# Patient Record
Sex: MALE | Race: WHITE | Employment: FULL TIME | URBAN - METROPOLITAN AREA
[De-identification: names, ages, dates, MRNs, and addresses within clinical notes are randomized per-mention and may not be internally consistent; named-entity substitution may affect disease eponyms.]

---

## 2018-02-08 ENCOUNTER — HOSPITAL ENCOUNTER (OUTPATIENT)
Age: 33
Discharge: HOME | End: 2018-02-08
Payer: COMMERCIAL

## 2018-02-08 PROBLEM — R00.2 HEART PALPITATIONS: Status: ACTIVE | Noted: 2018-02-08

## 2018-02-08 PROBLEM — Z72.0 CHEWS TOBACCO: Status: ACTIVE | Noted: 2018-02-08

## 2018-02-08 PROBLEM — F10.10 ETOH ABUSE: Status: ACTIVE | Noted: 2018-02-08

## 2018-02-08 PROBLEM — Z91.09 ENVIRONMENTAL ALLERGIES: Status: ACTIVE | Noted: 2018-02-08

## 2018-02-09 LAB
SEND OUT REPORT: NORMAL
TEST NAME: NORMAL

## 2018-12-17 PROBLEM — F10.10 ETOH ABUSE: Status: RESOLVED | Noted: 2018-02-08 | Resolved: 2018-12-17

## 2019-11-07 PROBLEM — L72.9 SCROTAL CYST: Status: ACTIVE | Noted: 2019-11-07

## 2019-12-12 ENCOUNTER — OFFICE VISIT (OUTPATIENT)
Dept: DERMATOLOGY | Age: 34
End: 2019-12-12
Payer: COMMERCIAL

## 2019-12-12 VITALS
BODY MASS INDEX: 30.91 KG/M2 | HEIGHT: 73 IN | SYSTOLIC BLOOD PRESSURE: 135 MMHG | DIASTOLIC BLOOD PRESSURE: 87 MMHG | WEIGHT: 233.2 LBS | HEART RATE: 94 BPM | OXYGEN SATURATION: 97 %

## 2019-12-12 DIAGNOSIS — Z91.09 NICKEL ALLERGY: ICD-10-CM

## 2019-12-12 DIAGNOSIS — L29.1 PRURITUS SCROTI: ICD-10-CM

## 2019-12-12 DIAGNOSIS — L72.9 SCROTAL CYST: Primary | ICD-10-CM

## 2019-12-12 PROCEDURE — 99202 OFFICE O/P NEW SF 15 MIN: CPT | Performed by: DERMATOLOGY

## 2019-12-12 RX ORDER — TRIAMCINOLONE ACETONIDE 1 MG/G
CREAM TOPICAL
Qty: 80 G | Refills: 1 | Status: SHIPPED | OUTPATIENT
Start: 2019-12-12

## 2019-12-27 ENCOUNTER — HOSPITAL ENCOUNTER (OUTPATIENT)
Age: 34
Discharge: HOME | End: 2019-12-27
Payer: COMMERCIAL

## 2019-12-27 DIAGNOSIS — R63.5 WEIGHT GAIN: ICD-10-CM

## 2019-12-27 DIAGNOSIS — F10.10 ALCOHOL ABUSE: ICD-10-CM

## 2019-12-27 LAB
ALBUMIN SERPL-MCNC: 4.6 G/DL (ref 3.5–5.2)
ALBUMIN/GLOBULIN RATIO: ABNORMAL (ref 1–2.5)
ALP BLD-CCNC: 73 U/L (ref 40–129)
ALT SERPL-CCNC: 113 U/L (ref 5–41)
ANION GAP SERPL CALCULATED.3IONS-SCNC: 11 MMOL/L (ref 9–17)
AST SERPL-CCNC: 50 U/L
BILIRUB SERPL-MCNC: 0.5 MG/DL (ref 0.3–1.2)
BUN BLDV-MCNC: 15 MG/DL (ref 6–20)
BUN/CREAT BLD: ABNORMAL (ref 9–20)
CALCIUM SERPL-MCNC: 10 MG/DL (ref 8.6–10.4)
CHLORIDE BLD-SCNC: 103 MMOL/L (ref 98–107)
CHOLESTEROL/HDL RATIO: 3.7
CHOLESTEROL: 208 MG/DL
CO2: 27 MMOL/L (ref 20–31)
CREAT SERPL-MCNC: 0.7 MG/DL (ref 0.7–1.2)
ESTIMATED AVERAGE GLUCOSE: 105 MG/DL
GFR AFRICAN AMERICAN: >60 ML/MIN
GFR NON-AFRICAN AMERICAN: >60 ML/MIN
GFR SERPL CREATININE-BSD FRML MDRD: ABNORMAL ML/MIN/{1.73_M2}
GFR SERPL CREATININE-BSD FRML MDRD: ABNORMAL ML/MIN/{1.73_M2}
GLUCOSE BLD-MCNC: 97 MG/DL (ref 70–99)
HBA1C MFR BLD: 5.3 % (ref 4–6)
HDLC SERPL-MCNC: 56 MG/DL
LDL CHOLESTEROL: 126 MG/DL (ref 0–130)
POTASSIUM SERPL-SCNC: 5 MMOL/L (ref 3.7–5.3)
SODIUM BLD-SCNC: 141 MMOL/L (ref 135–144)
TOTAL PROTEIN: 8 G/DL (ref 6.4–8.3)
TRIGL SERPL-MCNC: 129 MG/DL
VLDLC SERPL CALC-MCNC: ABNORMAL MG/DL (ref 1–30)

## 2019-12-27 PROCEDURE — 36415 COLL VENOUS BLD VENIPUNCTURE: CPT

## 2019-12-27 PROCEDURE — 80053 COMPREHEN METABOLIC PANEL: CPT

## 2019-12-27 PROCEDURE — 80061 LIPID PANEL: CPT

## 2019-12-27 PROCEDURE — 83036 HEMOGLOBIN GLYCOSYLATED A1C: CPT

## 2019-12-29 PROBLEM — R79.89 ELEVATED LFTS: Status: ACTIVE | Noted: 2019-12-29

## 2020-01-07 ENCOUNTER — HOSPITAL ENCOUNTER (OUTPATIENT)
Dept: ULTRASOUND IMAGING | Age: 35
Discharge: HOME | End: 2020-01-09
Payer: COMMERCIAL

## 2020-01-07 PROCEDURE — 76705 ECHO EXAM OF ABDOMEN: CPT

## 2020-01-14 PROBLEM — R16.0 HEPATOMEGALY: Status: ACTIVE | Noted: 2020-01-14

## 2020-10-14 ENCOUNTER — HOSPITAL ENCOUNTER (OUTPATIENT)
Age: 35
Setting detail: SPECIMEN
Discharge: HOME | End: 2020-10-14
Payer: COMMERCIAL

## 2020-10-14 ENCOUNTER — OFFICE VISIT (OUTPATIENT)
Dept: PRIMARY CARE CLINIC | Age: 35
End: 2020-10-14
Payer: COMMERCIAL

## 2020-10-14 VITALS
HEIGHT: 73 IN | HEART RATE: 95 BPM | BODY MASS INDEX: 30.48 KG/M2 | OXYGEN SATURATION: 97 % | WEIGHT: 230 LBS | TEMPERATURE: 97.2 F

## 2020-10-14 PROCEDURE — 99213 OFFICE O/P EST LOW 20 MIN: CPT | Performed by: NURSE PRACTITIONER

## 2020-10-14 NOTE — PROGRESS NOTES
[address]  [address]  [address] [address]  Dept: [phone]  Dept Fax: [phone]    Royal Sanders a 37 y.o. male who presents to the urgent care today for his medical conditions/complaintsas noted below. Montez Skiff is c/o of Concern For COVID-19 (fatigue, body aches, diarrhea x 2 days )      HPI:     Cc aches, fatigue, diarrhea for couple of days  Denies known covid exposure      Diarrhea    This is a new problem. Episode onset: couple days. The problem has been waxing and waning. Associated symptoms include myalgias. Pertinent negatives include no abdominal pain, chills, coughing, fever, URI or vomiting. Nothing aggravates the symptoms. There are no known risk factors. He has tried nothing for the symptoms. Past Medical History:   Diagnosis Date    History of narcotic addiction ([address]     clean X's 6 years       Current Outpatient Medications   Medication Sig Dispense Refill    triamcinolone (KENALOG) 0.1 % cream Apply to rash on abdomen twice daily for up to 2 weeks (not face, armpit or groin) (Patient not taking: Reported on 10/14/2020) 80 g 1     No current facility-administered medications for this visit. No Known Allergies    Subjective:     Review of Systems   Constitutional: Negative for chills and fever. Respiratory: Negative for cough. Gastrointestinal: Positive for diarrhea. Negative for abdominal pain and vomiting. Musculoskeletal: Positive for myalgias. All other systems reviewed and are negative. Objective:      Physical Exam  Vitals signs and nursing note reviewed. Constitutional:       General: He is not in acute distress. Appearance: Normal appearance. He is well-developed. He is not ill-appearing, toxic-appearing or diaphoretic. HENT:      Head: Normocephalic and atraumatic. Nose: Nose normal.      Mouth/Throat:      Mouth: Mucous membranes are moist.   Eyes:      General: No scleral icterus. Right eye: No discharge. Left eye: No discharge. Neck:      Musculoskeletal: Normal range of motion and neck supple. No neck rigidity. Cardiovascular:      Rate and Rhythm: Normal rate and regular rhythm. Heart sounds: Normal heart sounds. Pulmonary:      Effort: Pulmonary effort is normal. No respiratory distress. Breath sounds: Normal breath sounds. No stridor. No wheezing, rhonchi or rales. Abdominal:      General: Bowel sounds are normal.      Palpations: Abdomen is soft. Tenderness: There is no abdominal tenderness. There is no guarding. Musculoskeletal: Normal range of motion. General: No signs of injury. Skin:     General: Skin is warm and dry. Coloration: Skin is not jaundiced or pale. Findings: No erythema or rash. Neurological:      General: No focal deficit present. Mental Status: He is alert and oriented to person, place, and time. Psychiatric:         Mood and Affect: Mood normal.         Behavior: Behavior normal.       Pulse 95   Temp 97.2 °F (36.2 °C) (Tympanic)   Ht 6' 1\" (1.854 m)   Wt 230 lb (104.3 kg)   SpO2 97%   BMI 30.34 kg/m²     Assessment:          Diagnosis Orders   1. Viral illness  COVID-19 Ambulatory       Plan: You were tested for COVID today. We will call you with results when they are available. If you have not heard from us in 7 days, please call our office. Patient was evaluated carside today during this pandemic covid 19 situation.     Quarantine as directed  Await results  Increase fluids- stay hydrated  Good handwashing  Supportive care as discussed    If having symptoms- you need to be fever free for 24 hours, other symptoms resolved and at least 10 days passed since first symptom appeared before discontinuing  quarantine- CDC guidelines    tylenol as directed as needed over the counter if able to take  go to the ER for chest pain, abdominal pain, short of breath, hard time breathing, persistent vomiting, feeling weaker or dehydrated, any worsening, change or concern  Follow up with primary care for re evaluation  [place] covid 19 call center - 8-808-5-ASK- [facility] (1-)  Another good resource for information is coronavirus. [place].gov    If exposure, it is recommended 14 day quarantine  Return for follow up with primary care in 7 days, go to the ER for worsening, change or concern. Patient given educational materials - see patientinstructions. Discussed use, benefit, and side effects of prescribed medications. All patient questions answered. Pt voiced understanding.     Electronically signed by MOJGAN Jeronimo 10/14/2020 at 3:42 PM

## 2020-10-14 NOTE — PATIENT INSTRUCTIONS
You were tested for COVID today. We will call you with results when they are available. If you have not heard from us in 7 days, please call our office. Patient was evaluated carside today during this pandemic covid 19 situation. Quarantine as directed  Await results  Increase fluids- stay hydrated  Good handwashing  Supportive care as discussed    If having symptoms- you need to be fever free for 24 hours, other symptoms resolved and at least 10 days passed since first symptom appeared before discontinuing  quarantine- CDC guidelines    tylenol as directed as needed over the counter if able to take  go to the ER for chest pain, abdominal pain, short of breath, hard time breathing, persistent vomiting, feeling weaker or dehydrated, any worsening, change or concern  Follow up with primary care for re evaluation  [place] covid 19 call center - [phone]-ASK- [address]  Another good resource for information is coronavirus. [place].gov    If exposure, it is recommended 14 day quarantine

## 2020-10-16 ASSESSMENT — ENCOUNTER SYMPTOMS
DIARRHEA: 1
VOMITING: 0
COUGH: 0
ABDOMINAL PAIN: 0

## 2020-10-19 LAB — SARS-COV-2, NAA: NOT DETECTED

## 2020-12-12 ENCOUNTER — OFFICE VISIT (OUTPATIENT)
Dept: PRIMARY CARE CLINIC | Age: 35
End: 2020-12-12
Payer: COMMERCIAL

## 2020-12-12 VITALS
HEART RATE: 89 BPM | SYSTOLIC BLOOD PRESSURE: 138 MMHG | BODY MASS INDEX: 30.48 KG/M2 | OXYGEN SATURATION: 99 % | WEIGHT: 230 LBS | TEMPERATURE: 98.4 F | HEIGHT: 73 IN | DIASTOLIC BLOOD PRESSURE: 88 MMHG

## 2020-12-12 PROCEDURE — 99214 OFFICE O/P EST MOD 30 MIN: CPT | Performed by: NURSE PRACTITIONER

## 2020-12-12 RX ORDER — ACYCLOVIR 800 MG/1
800 TABLET ORAL
Qty: 35 TABLET | Refills: 0 | Status: SHIPPED | OUTPATIENT
Start: 2020-12-12 | End: 2020-12-19

## 2020-12-12 NOTE — PATIENT INSTRUCTIONS
Return worse  Patient Education        Shingles: Care Instructions  Your Care Instructions     Shingles (herpes zoster) causes pain and a blistered rash. The rash can appear anywhere on the body but will be on only one side of the body, the left or right. It will be in a band, a strip, or a small area. The pain can be very severe. Shingles can also cause tingling or itching in the area of the rash. The blisters scab over after a few days and heal in 2 to 4 weeks. Medicines can help you feel better and may help prevent more serious problems caused by shingles. Shingles is caused by the same virus that causes chickenpox. When you have chickenpox, the virus gets into your nerve roots and stays there (becomes dormant) long after you get over the chickenpox. If the virus becomes active again, it can cause shingles. Follow-up care is a key part of your treatment and safety. Be sure to make and go to all appointments, and call your doctor if you are having problems. It's also a good idea to know your test results and keep a list of the medicines you take. How can you care for yourself at home? · Be safe with medicines. Take your medicines exactly as prescribed. Call your doctor if you think you are having a problem with your medicine. Antiviral medicine helps you get better faster. · Try not to scratch or pick at the blisters. They will crust over and fall off on their own if you leave them alone. · Put cool, wet cloths on the area to relieve pain and itching. You can also use calamine lotion. Try not to use so much lotion that it cakes and is hard to get off. · Put cornstarch or baking soda on the sores to help dry them out so they heal faster. · Do not use thick ointment, such as petroleum jelly, on the sores. This will keep them from drying and healing. · To help remove loose crusts, soak them in tap water. This can help decrease oozing, and dry and soothe the skin.   · Take an over-the-counter pain medicine, such as acetaminophen (Tylenol), ibuprofen (Advil, Motrin), or naproxen (Aleve). Read and follow all instructions on the label. · Avoid close contact with people until the blisters have healed. It is very important for you to avoid contact with anyone who has never had chickenpox or the chickenpox vaccine. Pregnant women, young babies, and anyone else who has a hard time fighting infection (such as someone with HIV, diabetes, or cancer) is especially at risk. When should you call for help? Call your doctor now or seek immediate medical care if:    · You have a new or higher fever.     · You have a severe headache and a stiff neck.     · You lose the ability to think clearly.     · The rash spreads to your forehead, nose, eyes, or eyelids.     · You have eye pain, or your vision gets worse.     · You have new pain in your face, or you cannot move the muscles in your face.     · Blisters spread to new parts of your body. Watch closely for changes in your health, and be sure to contact your doctor if:    · The rash has not healed after 2 to 4 weeks.     · You still have pain after the rash has healed. Where can you learn more? Go to https://Mavenpepiceweb.Keisense. org and sign in to your Pocket Tales account. Dhruv Alicea in the [facility] box to learn more about \"Shingles: Care Instructions. \"     If you do not have an account, please click on the \"Sign Up Now\" link. Current as of: February 11, 2020               Content Version: 12.6  © 2006-2020 [employer]. Care instructions adapted under license by [facility] ([facility]). If you have questions about a medical condition or this instruction, always ask your healthcare professional. [place] [address] any warranty or liability for your use of this information. Patient Education        Shingles: Care Instructions  Your Care Instructions     Shingles (herpes zoster) causes pain and a blistered rash.  The rash can appear anywhere on the body but will be on only one side of the body, the left or right. It will be in a band, a strip, or a small area. The pain can be very severe. Shingles can also cause tingling or itching in the area of the rash. The blisters scab over after a few days and heal in 2 to 4 weeks. Medicines can help you feel better and may help prevent more serious problems caused by shingles. Shingles is caused by the same virus that causes chickenpox. When you have chickenpox, the virus gets into your nerve roots and stays there (becomes dormant) long after you get over the chickenpox. If the virus becomes active again, it can cause shingles. Follow-up care is a key part of your treatment and safety. Be sure to make and go to all appointments, and call your doctor if you are having problems. It's also a good idea to know your test results and keep a list of the medicines you take. How can you care for yourself at home? · Be safe with medicines. Take your medicines exactly as prescribed. Call your doctor if you think you are having a problem with your medicine. Antiviral medicine helps you get better faster. · Try not to scratch or pick at the blisters. They will crust over and fall off on their own if you leave them alone. · Put cool, wet cloths on the area to relieve pain and itching. You can also use calamine lotion. Try not to use so much lotion that it cakes and is hard to get off. · Put cornstarch or baking soda on the sores to help dry them out so they heal faster. · Do not use thick ointment, such as petroleum jelly, on the sores. This will keep them from drying and healing. · To help remove loose crusts, soak them in tap water. This can help decrease oozing, and dry and soothe the skin. · Take an over-the-counter pain medicine, such as acetaminophen (Tylenol), ibuprofen (Advil, Motrin), or naproxen (Aleve). Read and follow all instructions on the label.   · Avoid close contact with people until the blisters have healed. It is very important for you to avoid contact with anyone who has never had chickenpox or the chickenpox vaccine. Pregnant women, young babies, and anyone else who has a hard time fighting infection (such as someone with HIV, diabetes, or cancer) is especially at risk. When should you call for help? Call your doctor now or seek immediate medical care if:    · You have a new or higher fever.     · You have a severe headache and a stiff neck.     · You lose the ability to think clearly.     · The rash spreads to your forehead, nose, eyes, or eyelids.     · You have eye pain, or your vision gets worse.     · You have new pain in your face, or you cannot move the muscles in your face.     · Blisters spread to new parts of your body. Watch closely for changes in your health, and be sure to contact your doctor if:    · The rash has not healed after 2 to 4 weeks.     · You still have pain after the rash has healed. Where can you learn more? Go to https://Bluff WarspePush Technology.Ebix. org and sign in to your Socket Mobile account. Alexy Dowdw in the [employer] box to learn more about \"Shingles: Care Instructions. \"     If you do not have an account, please click on the \"Sign Up Now\" link. Current as of: February 11, 2020               Content Version: 12.6  © 1613-2933 [employer]. Care instructions adapted under license by [facility] ([facility] [place]). If you have questions about a medical condition or this instruction, always ask your healthcare professional. [place] [address] any warranty or liability for your use of this information.

## 2020-12-12 NOTE — PROGRESS NOTES
[address] CLINIC  [address] [facility] [address] [address]  Dept: [phone]  Dept Fax: [phone]    Damion Sanchez is a 37 y.o. male who presents to the urgent care today for his medical conditions/complaints as notedbelow. Damion Sanchez is c/o of Rash (Rash on lower back, painful, radiates down leg )      HPI:     37 yr old male presents for c/o painful rash to lower back. Started as sore area but did not see anything on his skin. No hx injury. Pain wraps around to front of thigh. Hx chicken pox as child. Has been run down, stressed out. Sx <72 hours, no previous hx shingles. Rash  This is a new problem. The current episode started in the past 7 days. The problem has been gradually worsening since onset. The affected locations include the back. The rash is characterized by pain and blistering. He was exposed to nothing. Associated symptoms include fatigue. Pertinent negatives include no fever or joint pain. Past treatments include nothing. The treatment provided no relief. His past medical history is significant for varicella. Past Medical History:   Diagnosis Date    History of narcotic addiction ([facility] [address]     clean X's 6 years        Current Outpatient Medications   Medication Sig Dispense Refill    acyclovir (ZOVIRAX) 800 MG tablet Take 1 tablet by mouth 5 times daily for 7 days 35 tablet 0    triamcinolone (KENALOG) 0.1 % cream Apply to rash on abdomen twice daily for up to 2 weeks (not face, armpit or groin) (Patient not taking: Reported on 10/14/2020) 80 g 1     No current facility-administered medications for this visit. No Known Allergies    Subjective:      Review of Systems   Constitutional: Positive for fatigue. Negative for fever. Musculoskeletal: Negative for joint pain. Skin: Positive for rash. All other systems reviewed and are negative. 14 systems reviewed and negative except as listed in HPI.     Objective:     Physical Medications    acyclovir (ZOVIRAX) 800 MG tablet     Sig: Take 1 tablet by mouth 5 times daily for 7 days     Dispense:  35 tablet     Refill:  0         Patient given educational materials - see patient instructions. Discussed use, benefit, and side effects of prescribed medications. All patient questions answered. Pt voicedunderstanding.     Electronically signed by MOJGAN Blanc CNP on 12/12/2020 at 1:53 PM

## 2020-12-19 ENCOUNTER — TELEPHONE (OUTPATIENT)
Dept: PRIMARY CARE CLINIC | Age: 35
End: 2020-12-19

## 2020-12-19 NOTE — TELEPHONE ENCOUNTER
Pt. Called stating he is finishing his medication today for the shingles he was diagnosed with on Dec. 12, 2020 and the area is still feeling super tender and painful, wanted to know if this is normal,I told pt. To finish up the medication he was given, then to give it a day or two and if things do not get any better to either follow up with his pcp or give us a call back. I also mention if any fever, or drainage from the cite occur to go to the er, or if the rash continues to spread. Patient verbalized understanding and agreed with the plan.

## 2024-04-01 ENCOUNTER — HOSPITAL ENCOUNTER (EMERGENCY)
Age: 39
Discharge: HOME | End: 2024-04-01
Attending: EMERGENCY MEDICINE

## 2024-04-01 ENCOUNTER — APPOINTMENT (OUTPATIENT)
Dept: CT IMAGING | Age: 39
End: 2024-04-01

## 2024-04-01 VITALS
DIASTOLIC BLOOD PRESSURE: 98 MMHG | TEMPERATURE: 98.3 F | RESPIRATION RATE: 19 BRPM | SYSTOLIC BLOOD PRESSURE: 154 MMHG | HEART RATE: 97 BPM | OXYGEN SATURATION: 97 %

## 2024-04-01 DIAGNOSIS — K57.32 DIVERTICULITIS OF COLON: Primary | ICD-10-CM

## 2024-04-01 LAB
ANION GAP SERPL CALCULATED.3IONS-SCNC: 12 MMOL/L (ref 9–16)
BACTERIA URNS QL MICRO: NORMAL
BASOPHILS # BLD: 0.04 K/UL (ref 0–0.2)
BASOPHILS NFR BLD: 0 % (ref 0–2)
BILIRUB UR QL STRIP: NEGATIVE
BUN SERPL-MCNC: 13 MG/DL (ref 6–20)
CALCIUM SERPL-MCNC: 9.9 MG/DL (ref 8.6–10.4)
CASTS #/AREA URNS LPF: NORMAL /LPF (ref 0–8)
CHLORIDE SERPL-SCNC: 101 MMOL/L (ref 98–107)
CLARITY UR: CLEAR
CO2 SERPL-SCNC: 24 MMOL/L (ref 20–31)
COLOR UR: YELLOW
CREAT SERPL-MCNC: 0.9 MG/DL (ref 0.7–1.2)
EOSINOPHIL # BLD: 0.41 K/UL (ref 0–0.44)
EOSINOPHILS RELATIVE PERCENT: 4 % (ref 1–4)
EPI CELLS #/AREA URNS HPF: NORMAL /HPF (ref 0–5)
ERYTHROCYTE [DISTWIDTH] IN BLOOD BY AUTOMATED COUNT: 12.1 % (ref 11.8–14.4)
GFR SERPL CREATININE-BSD FRML MDRD: >90 ML/MIN/1.73M2
GLUCOSE SERPL-MCNC: 102 MG/DL (ref 74–99)
GLUCOSE UR STRIP-MCNC: NEGATIVE MG/DL
HCT VFR BLD AUTO: 46 % (ref 40.7–50.3)
HGB BLD-MCNC: 15 G/DL (ref 13–17)
HGB UR QL STRIP.AUTO: NEGATIVE
IMM GRANULOCYTES # BLD AUTO: 0.03 K/UL (ref 0–0.3)
IMM GRANULOCYTES NFR BLD: 0 %
KETONES UR STRIP-MCNC: NEGATIVE MG/DL
LEUKOCYTE ESTERASE UR QL STRIP: NEGATIVE
LYMPHOCYTES NFR BLD: 2.35 K/UL (ref 1.1–3.7)
LYMPHOCYTES RELATIVE PERCENT: 22 % (ref 24–43)
MCH RBC QN AUTO: 27.8 PG (ref 25.2–33.5)
MCHC RBC AUTO-ENTMCNC: 32.6 G/DL (ref 28.4–34.8)
MCV RBC AUTO: 85.3 FL (ref 82.6–102.9)
MONOCYTES NFR BLD: 1.14 K/UL (ref 0.1–1.2)
MONOCYTES NFR BLD: 11 % (ref 3–12)
NEUTROPHILS NFR BLD: 63 % (ref 36–65)
NEUTS SEG NFR BLD: 6.57 K/UL (ref 1.5–8.1)
NITRITE UR QL STRIP: NEGATIVE
NRBC BLD-RTO: 0 PER 100 WBC
PH UR STRIP: 7.5 [PH] (ref 5–8)
PLATELET # BLD AUTO: 281 K/UL (ref 138–453)
PMV BLD AUTO: 10.2 FL (ref 8.1–13.5)
POTASSIUM SERPL-SCNC: 3.9 MMOL/L (ref 3.7–5.3)
PROT UR STRIP-MCNC: ABNORMAL MG/DL
RBC # BLD AUTO: 5.39 M/UL (ref 4.21–5.77)
RBC #/AREA URNS HPF: NORMAL /HPF (ref 0–4)
SODIUM SERPL-SCNC: 137 MMOL/L (ref 136–145)
SP GR UR STRIP: 1.02 (ref 1–1.03)
UROBILINOGEN UR STRIP-ACNC: NORMAL EU/DL (ref 0–1)
WBC #/AREA URNS HPF: NORMAL /HPF (ref 0–5)
WBC OTHER # BLD: 10.5 K/UL (ref 3.5–11.3)

## 2024-04-01 PROCEDURE — 80048 BASIC METABOLIC PNL TOTAL CA: CPT

## 2024-04-01 PROCEDURE — 99285 EMERGENCY DEPT VISIT HI MDM: CPT

## 2024-04-01 PROCEDURE — 87661 TRICHOMONAS VAGINALIS AMPLIF: CPT

## 2024-04-01 PROCEDURE — 6360000004 HC RX CONTRAST MEDICATION: Performed by: STUDENT IN AN ORGANIZED HEALTH CARE EDUCATION/TRAINING PROGRAM

## 2024-04-01 PROCEDURE — 87591 N.GONORRHOEAE DNA AMP PROB: CPT

## 2024-04-01 PROCEDURE — 6370000000 HC RX 637 (ALT 250 FOR IP): Performed by: STUDENT IN AN ORGANIZED HEALTH CARE EDUCATION/TRAINING PROGRAM

## 2024-04-01 PROCEDURE — 81001 URINALYSIS AUTO W/SCOPE: CPT

## 2024-04-01 PROCEDURE — 74177 CT ABD & PELVIS W/CONTRAST: CPT

## 2024-04-01 PROCEDURE — 87491 CHLMYD TRACH DNA AMP PROBE: CPT

## 2024-04-01 PROCEDURE — 85025 COMPLETE CBC W/AUTO DIFF WBC: CPT

## 2024-04-01 RX ORDER — AMOXICILLIN AND CLAVULANATE POTASSIUM 875; 125 MG/1; MG/1
1 TABLET, FILM COATED ORAL ONCE
Status: COMPLETED | OUTPATIENT
Start: 2024-04-01 | End: 2024-04-01

## 2024-04-01 RX ORDER — AMOXICILLIN AND CLAVULANATE POTASSIUM 875; 125 MG/1; MG/1
1 TABLET, FILM COATED ORAL EVERY 8 HOURS
Qty: 15 TABLET | Refills: 0 | Status: SHIPPED | OUTPATIENT
Start: 2024-04-01 | End: 2024-04-01

## 2024-04-01 RX ORDER — AMOXICILLIN AND CLAVULANATE POTASSIUM 875; 125 MG/1; MG/1
1 TABLET, FILM COATED ORAL EVERY 8 HOURS
Qty: 21 TABLET | Refills: 0 | Status: SHIPPED | OUTPATIENT
Start: 2024-04-01 | End: 2024-04-08

## 2024-04-01 RX ADMIN — AMOXICILLIN AND CLAVULANATE POTASSIUM 1 TABLET: 875; 125 TABLET, FILM COATED ORAL at 14:35

## 2024-04-01 RX ADMIN — IOPAMIDOL 75 ML: 755 INJECTION, SOLUTION INTRAVENOUS at 13:07

## 2024-04-01 ASSESSMENT — ENCOUNTER SYMPTOMS
BLOOD IN STOOL: 0
ABDOMINAL PAIN: 1
DIARRHEA: 0
CHEST TIGHTNESS: 0
SHORTNESS OF BREATH: 0
VOMITING: 0
CONSTIPATION: 0
NAUSEA: 0

## 2024-04-01 ASSESSMENT — PAIN DESCRIPTION - ORIENTATION: ORIENTATION: MID

## 2024-04-01 ASSESSMENT — PAIN DESCRIPTION - DESCRIPTORS: DESCRIPTORS: DULL;ACHING

## 2024-04-01 ASSESSMENT — PAIN SCALES - GENERAL: PAINLEVEL_OUTOF10: 8

## 2024-04-01 ASSESSMENT — PAIN - FUNCTIONAL ASSESSMENT: PAIN_FUNCTIONAL_ASSESSMENT: 0-10

## 2024-04-01 ASSESSMENT — PAIN DESCRIPTION - LOCATION: LOCATION: PELVIS;FLANK

## 2024-04-01 NOTE — ED TRIAGE NOTES
Pt presents to the ED via triage with c/o mid abdominal/pelvic pain that radiates to his flank x 24 hours.  Pt also states he's experiencing pressure while urinating. Pt denies any discharge. Pt denies chest pain and shortness of breath. Pt states he's had a fever at home but was afebrile. VSS, NAD, AOx4. Patient resting in bed, respirations even and unlabored. Call light in reach.

## 2024-04-01 NOTE — ED PROVIDER NOTES
[facility]     Emergency Department     Faculty Attestation    I performed a history and physical examination of the patient and discussed management with the resident. I reviewed the resident’s note and agree with the documented findings and plan of care. Any areas of disagreement are noted on the chart. I was personally present for the key portions of any procedures. I have documented in the chart those procedures where I was not present during the key portions. I have reviewed the emergency nurses triage note. I agree with the chief complaint, past medical history, past surgical history, allergies, medications, social and family history as documented unless otherwise noted below.        For Physician Assistant/ Nurse Practitioner cases/documentation I have personally evaluated this patient and have completed at least one if not all key elements of the E/M (history, physical exam, and MDM). Additional findings are as noted.  I have personally seen and evaluated the patient.  I find the patient's history and physical exam are consistent with the NP/PA documentation.  I agree with the care provided, treatment rendered, disposition and follow-up plan.    Patient has rlq pain suspicious for appendicitis pos rovsing non peritoneal      Critical Care     Michael Sarabia M.D.  Attending Emergency  Physician           Michael Sarabia MD  04/01/24 1300

## 2024-04-01 NOTE — DISCHARGE INSTRUCTIONS
You were found to have diverticulitis.   Take the antibiotic as prescribed for the full course.    Keep an eye on your symptoms, if you were to develop any worsening pain or if the pain is not going away, persistent fevers, nausea or vomiting, immediately return to emergency department as you may have an appendicitis, as your visit today was may be an early appendicitis.    Follow-up with a gastroenterologist as listed in your paperwork or [facility].

## 2024-04-01 NOTE — ED PROVIDER NOTES
[facility] ED  Emergency Department Encounter  Emergency Medicine Resident     Pt Name:Nathanael Ramos  MRN: [id number]  Birthdate 1985  Date of evaluation: 24  PCP:  No primary care provider on file.  Note Started: 12:12 PM EDT      CHIEF COMPLAINT       Chief Complaint   Patient presents with    Abdominal Pain    Fever       HISTORY OF PRESENT ILLNESS  (Location/Symptom, Timing/Onset, Context/Setting, Quality, Duration, Modifying Factors, Severity.)      Nathanael Ramos is a 39 y.o. male who presents with right lower quadrant abdominal pain. Worsens when walking and going over bumps. He states it began yesterday.  States he also has subjective fevers and chills.  [facility] to [facility] ER for evaluation for appendicitis.  He denies any loose stools, he is having regular bowel movements and has no dysuria.  He states he did have an episode like this a month ago that self resolved.  He denies any abdominal surgeries.,  He has no testicular pain or swelling, no penile discharge.    PAST MEDICAL / SURGICAL / SOCIAL / FAMILY HISTORY      has a past medical history of History of narcotic addiction (HCC).       has a past surgical history that includes [place] tooth extraction ().      Social History     Socioeconomic History    Marital status:      Spouse name: Not on file    Number of children: Not on file    Years of education: Not on file    Highest education level: Not on file   Occupational History    Not on file   Tobacco Use    Smoking status: Former     Current packs/day: 0.00     Average packs/day: 1 pack/day for 10.0 years (10.0 ttl pk-yrs)     Types: Cigarettes     Start date: 2006     Quit date: 2016     Years since quittin.1    Smokeless tobacco: Current     Types: Chew    Tobacco comments:     7 cans/week   Substance and Sexual Activity    Alcohol use: Yes     Alcohol/week: 60.0 standard drinks of alcohol     Types: 60 Cans of beer

## 2024-04-02 LAB
CHLAMYDIA DNA UR QL NAA+PROBE: NEGATIVE
N GONORRHOEA DNA UR QL NAA+PROBE: NEGATIVE
SPECIMEN DESCRIPTION: NORMAL

## 2024-04-03 LAB
SOURCE: NORMAL
TRICHOMONAS VAGINALI, MOLECULAR: NEGATIVE

## 2025-02-17 ENCOUNTER — OFFICE VISIT (OUTPATIENT)
Dept: FAMILY MEDICINE CLINIC | Age: 40
End: 2025-02-17
Payer: OTHER GOVERNMENT

## 2025-02-17 ENCOUNTER — HOSPITAL ENCOUNTER (OUTPATIENT)
Dept: GENERAL RADIOLOGY | Age: 40
Discharge: HOME | End: 2025-02-19

## 2025-02-17 ENCOUNTER — HOSPITAL ENCOUNTER (OUTPATIENT)
Age: 40
Discharge: HOME | End: 2025-02-19

## 2025-02-17 VITALS
DIASTOLIC BLOOD PRESSURE: 89 MMHG | TEMPERATURE: 98.2 F | SYSTOLIC BLOOD PRESSURE: 141 MMHG | HEART RATE: 89 BPM | OXYGEN SATURATION: 99 %

## 2025-02-17 DIAGNOSIS — R07.81 RIB PAIN ON RIGHT SIDE: Primary | ICD-10-CM
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
## 2025-02-17 DIAGNOSIS — R07.81 RIB PAIN ON RIGHT SIDE: ICD-10-CM

## 2025-02-17 PROCEDURE — 99214 OFFICE O/P EST MOD 30 MIN: CPT | Performed by: NURSE PRACTITIONER

## 2025-02-17 PROCEDURE — 71101 X-RAY EXAM UNILAT RIBS/CHEST: CPT

## 2025-02-17 RX ORDER — OMEPRAZOLE 10 MG/1
10 CAPSULE, DELAYED RELEASE ORAL DAILY
COMMUNITY

## 2025-02-17 ASSESSMENT — ENCOUNTER SYMPTOMS
SPUTUM PRODUCTION: 0
ABDOMINAL PAIN: 0
VOMITING: 0
NAUSEA: 0
HEMOPTYSIS: 0
BACK PAIN: 0
SHORTNESS OF BREATH: 0
ORTHOPNEA: 0
RESPIRATORY NEGATIVE: 1
COUGH: 0

## 2025-02-17 NOTE — PROGRESS NOTES
[facility] PHYSICIANS [facility] WALK-IN FAMILY MEDICINE  [address]  [address]  [facility] [address]  Dept: [phone]  Dept Fax: [phone]    Nathanael Ramos is a 39 y.o. male who presents to the urgent care today for his medical conditions/complaints as notedbelow.  Nathanael Ramos is c/o of Rib Injury (Onset last Sunday bad fell rib injury while sledding. Sore in chest and ribs.)      HPI:     39-year-old male presents for rt side anterior rib pain from sledding accident 8 days ago. Did not hit head, ambulatory at scene  Standing on sled, made it to bottom and then fell, landing on rt side, arm went into rt side of his chest  No bruising, discoloration. No sob, no cough  Was feeling better but had to shovel, now sx worse, muscles ache  Wants to make sure no rib fx  Wants no medications, self described alcoholic.   [facility]    No sob, no left sided chest pain, no syncope    Chest Pain   This is a new problem. The current episode started 1 to 4 weeks ago (8 days). The onset quality is sudden. The problem occurs constantly. The problem has been waxing and waning. The pain is present in the substernal region (and rt lateral between sternum and nipple). The pain is mild. Quality: aching. The pain does not radiate. Pertinent negatives include no abdominal pain, back pain, claudication, cough, diaphoresis, dizziness, exertional chest pressure, fever, headaches, hemoptysis, irregular heartbeat, leg pain, lower extremity edema, malaise/fatigue, nausea, near-syncope, numbness, orthopnea, palpitations, PND, shortness of breath, sputum production, syncope, vomiting or weakness. The pain is aggravated by movement (using pectoral muscles). He has tried acetaminophen for the symptoms. The treatment provided mild relief. Risk factors include alcohol intake and male gender.       Past Medical History:   Diagnosis Date    History of narcotic addiction (HCC)     clean X's 6

## 2025-02-26 ENCOUNTER — OFFICE VISIT (OUTPATIENT)
Dept: FAMILY MEDICINE CLINIC | Age: 40
End: 2025-02-26

## 2025-02-26 VITALS
HEIGHT: 73 IN | HEART RATE: 94 BPM | WEIGHT: 234 LBS | DIASTOLIC BLOOD PRESSURE: 78 MMHG | BODY MASS INDEX: 31.01 KG/M2 | SYSTOLIC BLOOD PRESSURE: 118 MMHG | OXYGEN SATURATION: 97 %

## 2025-02-26 DIAGNOSIS — Z13.1 DIABETES MELLITUS SCREENING: ICD-10-CM

## 2025-02-26 DIAGNOSIS — F43.10 PTSD (POST-TRAUMATIC STRESS DISORDER): ICD-10-CM

## 2025-02-26 DIAGNOSIS — Z13.220 SCREENING FOR HYPERLIPIDEMIA: ICD-10-CM

## 2025-02-26 DIAGNOSIS — F33.1 MODERATE EPISODE OF RECURRENT MAJOR DEPRESSIVE DISORDER (HCC): ICD-10-CM

## 2025-02-26 DIAGNOSIS — Z13.6 ENCOUNTER FOR SCREENING FOR CARDIOVASCULAR DISORDERS: ICD-10-CM

## 2025-02-26 DIAGNOSIS — F10.10 AA (ALCOHOL ABUSE): ICD-10-CM

## 2025-02-26 DIAGNOSIS — Z00.00 ENCOUNTER FOR MEDICAL EXAMINATION TO ESTABLISH CARE: Primary | ICD-10-CM

## 2025-02-26 DIAGNOSIS — R79.89 ELEVATED LFTS: ICD-10-CM

## 2025-02-26 DIAGNOSIS — K21.9 GASTROESOPHAGEAL REFLUX DISEASE WITHOUT ESOPHAGITIS: ICD-10-CM

## 2025-02-26 SDOH — ECONOMIC STABILITY: FOOD INSECURITY: WITHIN THE PAST 12 MONTHS, YOU WORRIED THAT YOUR FOOD WOULD RUN OUT BEFORE YOU GOT MONEY TO BUY MORE.: NEVER TRUE

## 2025-02-26 SDOH — ECONOMIC STABILITY: FOOD INSECURITY: WITHIN THE PAST 12 MONTHS, THE FOOD YOU BOUGHT JUST DIDN'T LAST AND YOU DIDN'T HAVE MONEY TO GET MORE.: NEVER TRUE

## 2025-02-26 ASSESSMENT — PATIENT HEALTH QUESTIONNAIRE - PHQ9
7. TROUBLE CONCENTRATING ON THINGS, SUCH AS READING THE NEWSPAPER OR WATCHING TELEVISION: SEVERAL DAYS
9. THOUGHTS THAT YOU WOULD BE BETTER OFF DEAD, OR OF HURTING YOURSELF: NOT AT ALL
SUM OF ALL RESPONSES TO PHQ QUESTIONS 1-9: 11
5. POOR APPETITE OR OVEREATING: NOT AT ALL
4. FEELING TIRED OR HAVING LITTLE ENERGY: SEVERAL DAYS
8. MOVING OR SPEAKING SO SLOWLY THAT OTHER PEOPLE COULD HAVE NOTICED. OR THE OPPOSITE, BEING SO FIGETY OR RESTLESS THAT YOU HAVE BEEN MOVING AROUND A LOT MORE THAN USUAL: NOT AT ALL
SUM OF ALL RESPONSES TO PHQ9 QUESTIONS 1 & 2: 6
3. TROUBLE FALLING OR STAYING ASLEEP: NOT AT ALL
2. FEELING DOWN, DEPRESSED OR HOPELESS: NEARLY EVERY DAY
6. FEELING BAD ABOUT YOURSELF - OR THAT YOU ARE A FAILURE OR HAVE LET YOURSELF OR YOUR FAMILY DOWN: NEARLY EVERY DAY
10. IF YOU CHECKED OFF ANY PROBLEMS, HOW DIFFICULT HAVE THESE PROBLEMS MADE IT FOR YOU TO DO YOUR WORK, TAKE CARE OF THINGS AT HOME, OR GET ALONG WITH OTHER PEOPLE: VERY DIFFICULT
SUM OF ALL RESPONSES TO PHQ QUESTIONS 1-9: 11
1. LITTLE INTEREST OR PLEASURE IN DOING THINGS: NEARLY EVERY DAY

## 2025-02-26 NOTE — ASSESSMENT & PLAN NOTE
Pint of whiskey in one day (binge drinking), in addition may have 2 to 6 beers in a day   -discussed importance of alcohol cessation and other medical concerns it can lead to  -patient open to following up with psychiatry   -in addition discussed AA meetings   -will evaluate for folic acid, vitamin b12 and other vitamin deficiencies at next visit  Consider starting thiamine and folic acid   Orders:    EGD Routine; Future    [facility]    External Referral To Psychology    External Referral To Psychiatry

## 2025-02-26 NOTE — PROGRESS NOTES
Nathanael Ramos (:  1985) is a 39 y.o. male,New patient, here for evaluation of the following chief complaint(s):  Established New Doctor         Assessment & Plan  Encounter for medical examination to establish care   New patient here to clinic establishing care with history of PTSD, Anxiety, Depression, GERD, Alcohol use disorder, fatty liver disease, and history of narcotic use, chewing tobacco   -[facility]   -will obtain blood work, [facility]   -currently with alcohol abuse per patient has stopped since  but at times in one day can have up to 11 drinks in a day, (while pint of whiskey) can drink from 2 beers to 6 beers in one sitting, issues with handling stress   -Discussed AA programs and meetings    -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   - [facility], will obtain blood work        AA (alcohol abuse)   Pint of whiskey in one day (binge drinking), in addition may have 2 to 6 beers in a day   -discussed importance of alcohol cessation and other medical concerns it can lead to  -patient open to following up with psychiatry   -in addition discussed AA meetings   -will evaluate for folic acid, vitamin b12 and other vitamin deficiencies at next visit  Consider starting thiamine and folic acid   Orders:    EGD Routine; Future    [facility]    External Referral To Psychology    External Referral To Psychiatry    Gastroesophageal reflux disease without esophagitis   History of Palmer's esophagus and acid reflux that has not been improving with omeprazole  -patient with alcohol use disorder   -will evaluate with EGD and referral to GI for further analysis and evaluation   -Denies any hematemesis and blood in stool     Orders:    EGD Routine; Future    [facility] [place] [facility]    Moderate episode of recurrent major depressive disorder

## 2025-02-26 NOTE — ASSESSMENT & PLAN NOTE
Likely due to alcohol use and fatty liver disease  Discussed how alcohol cessation and weight loss would help with overall health and symptoms     Orders:    EGD Routine; Future    [facility]     Called report to erin tabor pt. Ready to dc

## 2025-03-02 PROBLEM — F43.10 PTSD (POST-TRAUMATIC STRESS DISORDER): Status: ACTIVE | Noted: 2025-03-02

## 2025-03-02 PROBLEM — F33.1 MODERATE EPISODE OF RECURRENT MAJOR DEPRESSIVE DISORDER (HCC): Status: ACTIVE | Noted: 2025-03-02

## 2025-03-02 PROBLEM — K21.9 GASTROESOPHAGEAL REFLUX DISEASE WITHOUT ESOPHAGITIS: Status: ACTIVE | Noted: 2025-03-02

## 2025-03-05 ENCOUNTER — PREP FOR PROCEDURE (OUTPATIENT)
Dept: GASTROENTEROLOGY | Age: 40
End: 2025-03-05

## 2025-03-05 ENCOUNTER — OFFICE VISIT (OUTPATIENT)
Dept: GASTROENTEROLOGY | Age: 40
End: 2025-03-05
Payer: COMMERCIAL

## 2025-03-05 VITALS
TEMPERATURE: 97 F | HEART RATE: 97 BPM | OXYGEN SATURATION: 97 % | DIASTOLIC BLOOD PRESSURE: 83 MMHG | RESPIRATION RATE: 16 BRPM | SYSTOLIC BLOOD PRESSURE: 141 MMHG | BODY MASS INDEX: 30.61 KG/M2 | WEIGHT: 232 LBS

## 2025-03-05 DIAGNOSIS — K21.9 GASTROESOPHAGEAL REFLUX DISEASE, UNSPECIFIED WHETHER ESOPHAGITIS PRESENT: Primary | ICD-10-CM

## 2025-03-05 DIAGNOSIS — R19.4 ALTERED BOWEL HABITS: ICD-10-CM

## 2025-03-05 DIAGNOSIS — R10.13 EPIGASTRIC PAIN: ICD-10-CM

## 2025-03-05 DIAGNOSIS — K76.0 HEPATIC STEATOSIS: ICD-10-CM

## 2025-03-05 DIAGNOSIS — K57.90 DIVERTICULOSIS: ICD-10-CM

## 2025-03-05 DIAGNOSIS — F10.10 ALCOHOL ABUSE: ICD-10-CM

## 2025-03-05 DIAGNOSIS — K57.92 DIVERTICULITIS: ICD-10-CM

## 2025-03-05 PROCEDURE — 99204 OFFICE O/P NEW MOD 45 MIN: CPT | Performed by: PHYSICIAN ASSISTANT

## 2025-03-05 RX ORDER — FAMOTIDINE 40 MG/1
40 TABLET, FILM COATED ORAL EVERY EVENING
Qty: 30 TABLET | Refills: 3 | Status: SHIPPED | OUTPATIENT
Start: 2025-03-05

## 2025-03-05 RX ORDER — OMEPRAZOLE 40 MG/1
40 CAPSULE, DELAYED RELEASE ORAL
Qty: 90 CAPSULE | Refills: 1 | Status: SHIPPED | OUTPATIENT
Start: 2025-03-05

## 2025-03-05 ASSESSMENT — ENCOUNTER SYMPTOMS
NAUSEA: 1
DIARRHEA: 0
WHEEZING: 0
VOICE CHANGE: 0
ABDOMINAL DISTENTION: 1
COUGH: 0
TROUBLE SWALLOWING: 0
BLOOD IN STOOL: 1
ANAL BLEEDING: 0
SORE THROAT: 0
VOMITING: 0
CONSTIPATION: 0
RECTAL PAIN: 0
ABDOMINAL PAIN: 1
CHOKING: 0

## 2025-03-05 NOTE — TELEPHONE ENCOUNTER
Procedure scheduled/Dr Norris  Procedure:colonoscopy - egd  Dx: Diverticulosis ; Altered bowel habits Gastroesophageal reflux disease, unspecified whether esophagitis present (; Epigastric pain Alcohol abuse ; Hepatic steatosis   Date: 6/18/25  Time: 11:00 am arrive at 9:00 am  H[facility] phone call:DOMINICK  Bowel Prep instructions given:SuPREP  In office/via phone: OV  Clearance needed:none  GLP-1: none

## 2025-03-05 NOTE — PROGRESS NOTES
(PRILOSEC) 40 MG delayed release capsule      2. Epigastric pain  EGD      3. Alcohol abuse  Anti-smooth muscle antibody    Mitochondrial antibodies, M2    Hepatic Function Panel    Platelet Count    Protime-INR    EGD    CT ABDOMEN PELVIS W IV CONTRAST Additional Contrast? None      4. Hepatic steatosis  Anti-smooth muscle antibody    Mitochondrial antibodies, M2    Hepatic Function Panel    Platelet Count    Protime-INR    EGD    CT ABDOMEN PELVIS W IV CONTRAST Additional Contrast? None    Hepatitis B Core Antibody, Total    Hepatitis A Antibody, Total    Hepatitis C Antibody    Hepatitis B Surface Antigen    AFP Tumor Marker      5. Diverticulosis  COLONOSCOPY W/ OR W/O BIOPSY      6. Altered bowel habits  COLONOSCOPY W/ OR W/O BIOPSY        Finish liver workup though very likely his steatosis is solely due to EtOH due to his large quantity of alcohol consumed  Recommend sober supports / AA / IOP.  Trial of naltrexone/campryl may be beneficial - he is not very interested at this time. Absolutely needs to quit drinking.     EGD/colonoscopy  Increase PPI to 40mg omeprazole per day  Liver labs  Update abd imaging with CT abd pelvis and rule out any cirrhosis        I did review all the GI medications with the side effects with the patient  I did refill her needed GI medication    Diet/life style/natural hx /complication of the dx were all explained in details   Past medical, past surgical, social history, psychiatric history, medications or allergies, all reviewed and  updated    Thank you for allowing me to participate in the care of Mr. Ramos. For any further questions please do not hesitate to contact me.    I have reviewed and agree with the MA/RN ROS.   Note is dictated utilizing voice recognition software. Unfortunately this leads to occasional typographical errors. Please contact our office if you have any questions.  This note is created with the assistance of the speech recognition program. While intending

## 2025-03-06 ENCOUNTER — HOSPITAL ENCOUNTER (OUTPATIENT)
Age: 40
Setting detail: SPECIMEN
Discharge: HOME | End: 2025-03-06

## 2025-03-06 ENCOUNTER — HOSPITAL ENCOUNTER (OUTPATIENT)
Dept: CT IMAGING | Age: 40
Discharge: HOME | End: 2025-03-08
Payer: COMMERCIAL

## 2025-03-06 DIAGNOSIS — F10.10 ALCOHOL ABUSE: ICD-10-CM

## 2025-03-06 DIAGNOSIS — K76.0 HEPATIC STEATOSIS: ICD-10-CM

## 2025-03-06 LAB
AFP SERPL-MCNC: 3.9 UG/L
ALBUMIN SERPL-MCNC: 4.9 G/DL (ref 3.5–5.2)
ALBUMIN/GLOB SERPL: 1.6 {RATIO} (ref 1–2.5)
ALP SERPL-CCNC: 95 U/L (ref 40–129)
ALT SERPL-CCNC: 93 U/L (ref 10–50)
AST SERPL-CCNC: 51 U/L (ref 10–50)
BILIRUB DIRECT SERPL-MCNC: 0.3 MG/DL (ref 0–0.2)
BILIRUB INDIRECT SERPL-MCNC: 0.5 MG/DL (ref 0–1)
BILIRUB SERPL-MCNC: 0.8 MG/DL (ref 0–1.2)
GLOBULIN SER CALC-MCNC: 3.1 G/DL
HAV AB SERPL IA-ACNC: REACTIVE
HBV CORE AB SER QL: NONREACTIVE
HBV SURFACE AG SERPL QL IA: NONREACTIVE
HCV AB SERPL QL IA: NONREACTIVE
INR PPP: 0.9
PLATELET # BLD AUTO: 316 K/UL (ref 138–453)
PROT SERPL-MCNC: 8 G/DL (ref 6.6–8.7)
PROTHROMBIN TIME: 12.8 SEC (ref 11.7–14.9)

## 2025-03-06 PROCEDURE — 6360000004 HC RX CONTRAST MEDICATION: Performed by: PHYSICIAN ASSISTANT

## 2025-03-06 PROCEDURE — 74177 CT ABD & PELVIS W/CONTRAST: CPT

## 2025-03-06 RX ORDER — SODIUM, POTASSIUM,MAG SULFATES 17.5-3.13G
SOLUTION, RECONSTITUTED, ORAL ORAL
Qty: 1 EACH | Refills: 0 | Status: SHIPPED | OUTPATIENT
Start: 2025-03-06

## 2025-03-06 RX ORDER — IOPAMIDOL 755 MG/ML
75 INJECTION, SOLUTION INTRAVASCULAR
Status: COMPLETED | OUTPATIENT
Start: 2025-03-06 | End: 2025-03-06

## 2025-03-06 RX ORDER — BISACODYL 5 MG/1
TABLET, DELAYED RELEASE ORAL
Qty: 4 TABLET | Refills: 0 | Status: SHIPPED | OUTPATIENT
Start: 2025-03-06

## 2025-03-06 RX ADMIN — IOPAMIDOL 75 ML: 755 INJECTION, SOLUTION INTRAVENOUS at 16:55

## 2025-03-07 ENCOUNTER — TELEPHONE (OUTPATIENT)
Dept: GASTROENTEROLOGY | Age: 40
End: 2025-03-07

## 2025-03-07 LAB — MITOCHONDRIA M2 IGG SER-ACNC: 0.9 U/ML (ref 0–4)

## 2025-03-07 NOTE — TELEPHONE ENCOUNTER
Patient lvm requesting a callback regarding positive results of hepatitis A. He would like to know what's next?

## 2025-03-08 LAB — SMOOTH MUSCLE ANTIBODY: 4 UNITS (ref 0–19)

## 2025-03-10 NOTE — TELEPHONE ENCOUNTER
The antibody that we checked is total Hep A Ab. This can be positive if he has been vaccinated against hepatitis A and/or if he has current hep a infection. Most likely the former. S/s acute hep a are nausea, vomiting, abd pain, jaundice, etc. If he has any of these symptoms I can get the IgM (acute antibody), otherwise we will assume this is + from vaccine status

## 2025-03-11 NOTE — TELEPHONE ENCOUNTER
Spoke with patient. He thinks he was vaccinated for Hep A because he was in the [employer]. He has been trying to confirm that one way or another. He also asking about his elevated bilirubin. It was 0.3 and wondering if that can be high if he does have an active Hep A infection, or even why that could be up when it is usually normal.

## 2025-06-04 ENCOUNTER — HOSPITAL ENCOUNTER (OUTPATIENT)
Dept: PREADMISSION TESTING | Age: 40
Discharge: HOME | End: 2025-06-08

## 2025-06-04 VITALS — BODY MASS INDEX: 30.48 KG/M2 | WEIGHT: 230 LBS | HEIGHT: 73 IN

## 2025-06-04 RX ORDER — CETIRIZINE HYDROCHLORIDE 10 MG/1
10 TABLET ORAL DAILY PRN
COMMUNITY
Start: 2025-04-08

## 2025-06-04 NOTE — PROGRESS NOTES
where you will be prepared for surgery.  A physical assessment will be performed by a nurse practitioner or house officer.  Your IV will be started and you will meet your anesthesiologist.    When you go to surgery, your family will be directed to the surgical waiting room, where the doctor should speak with them after your surgery.    After surgery, you will be taken to the recovery area.  When you are alert and stable, you will receive instructions and be prepared for discharge.   Instructions reviewed by phone with Nathanael, he verbalized understanding.

## 2025-06-16 NOTE — PRE-PROCEDURE INSTRUCTIONS
No answer, left message ?                             Unable to leave message ?    When were you told to arrive at hospital ? -0900     Do you have a  ?-YES    Are you on any blood thinners ? -NONE                    If yes when did you stop taking ?    Do you have your prep Rx filled and instruction ?  -YES    Nothing to eat the day before , only clear liquids.-INSTRUCTED    Are you experiencing any covid symptoms ? -NONE    Do you have any infections or rash we should be aware of ?-NONE      Do you have the Hibiclens soap to use the night before and the morning of surgery ?-N/A    Nothing to eat or drink after midnight, only a sip of water to take any medication instructed to take the night before.-INSTRUCTED    Wear comfortable clothing, leave any valuables at home, remove any jewelry and body piercing .-INSTRUCTED

## 2025-06-17 ENCOUNTER — ANESTHESIA EVENT (OUTPATIENT)
Dept: ENDOSCOPY | Age: 40
End: 2025-06-17
Payer: COMMERCIAL

## 2025-06-17 ENCOUNTER — PATIENT MESSAGE (OUTPATIENT)
Dept: GASTROENTEROLOGY | Age: 40
End: 2025-06-17

## 2025-06-18 ENCOUNTER — HOSPITAL ENCOUNTER (OUTPATIENT)
Age: 40
Setting detail: OUTPATIENT SURGERY
Discharge: HOME | End: 2025-06-18
Attending: INTERNAL MEDICINE | Admitting: INTERNAL MEDICINE
Payer: COMMERCIAL

## 2025-06-18 ENCOUNTER — ANESTHESIA (OUTPATIENT)
Dept: ENDOSCOPY | Age: 40
End: 2025-06-18
Payer: COMMERCIAL

## 2025-06-18 VITALS
DIASTOLIC BLOOD PRESSURE: 73 MMHG | RESPIRATION RATE: 15 BRPM | TEMPERATURE: 97.7 F | SYSTOLIC BLOOD PRESSURE: 102 MMHG | HEART RATE: 88 BPM | OXYGEN SATURATION: 100 %

## 2025-06-18 DIAGNOSIS — K76.0 HEPATIC STEATOSIS: ICD-10-CM

## 2025-06-18 DIAGNOSIS — F10.10 ALCOHOL ABUSE: ICD-10-CM

## 2025-06-18 DIAGNOSIS — K21.9 GASTROESOPHAGEAL REFLUX DISEASE, UNSPECIFIED WHETHER ESOPHAGITIS PRESENT: ICD-10-CM

## 2025-06-18 DIAGNOSIS — R10.13 EPIGASTRIC PAIN: ICD-10-CM

## 2025-06-18 DIAGNOSIS — K57.92 DIVERTICULITIS: ICD-10-CM

## 2025-06-18 DIAGNOSIS — R19.4 ALTERED BOWEL HABITS: ICD-10-CM

## 2025-06-18 PROCEDURE — 45378 DIAGNOSTIC COLONOSCOPY: CPT | Performed by: INTERNAL MEDICINE

## 2025-06-18 PROCEDURE — 7100000010 HC PHASE II RECOVERY - FIRST 15 MIN: Performed by: INTERNAL MEDICINE

## 2025-06-18 PROCEDURE — 2709999900 HC NON-CHARGEABLE SUPPLY: Performed by: INTERNAL MEDICINE

## 2025-06-18 PROCEDURE — 6360000002 HC RX W HCPCS: Performed by: NURSE ANESTHETIST, CERTIFIED REGISTERED

## 2025-06-18 PROCEDURE — 88305 TISSUE EXAM BY PATHOLOGIST: CPT

## 2025-06-18 PROCEDURE — 3609027000 HC COLONOSCOPY: Performed by: INTERNAL MEDICINE

## 2025-06-18 PROCEDURE — 3609012400 HC EGD TRANSORAL BIOPSY SINGLE/MULTIPLE: Performed by: INTERNAL MEDICINE

## 2025-06-18 PROCEDURE — 2580000003 HC RX 258: Performed by: ANESTHESIOLOGY

## 2025-06-18 PROCEDURE — 3700000001 HC ADD 15 MINUTES (ANESTHESIA): Performed by: INTERNAL MEDICINE

## 2025-06-18 PROCEDURE — 7100000011 HC PHASE II RECOVERY - ADDTL 15 MIN: Performed by: INTERNAL MEDICINE

## 2025-06-18 PROCEDURE — 3700000000 HC ANESTHESIA ATTENDED CARE: Performed by: INTERNAL MEDICINE

## 2025-06-18 RX ORDER — LIDOCAINE HYDROCHLORIDE 10 MG/ML
1 INJECTION, SOLUTION EPIDURAL; INFILTRATION; INTRACAUDAL; PERINEURAL
Status: DISCONTINUED | OUTPATIENT
Start: 2025-06-18 | End: 2025-06-18 | Stop reason: HOSPADM

## 2025-06-18 RX ORDER — SODIUM CHLORIDE, SODIUM LACTATE, POTASSIUM CHLORIDE, CALCIUM CHLORIDE 600; 310; 30; 20 MG/100ML; MG/100ML; MG/100ML; MG/100ML
INJECTION, SOLUTION INTRAVENOUS CONTINUOUS
Status: DISCONTINUED | OUTPATIENT
Start: 2025-06-18 | End: 2025-06-18 | Stop reason: HOSPADM

## 2025-06-18 RX ORDER — PROPOFOL 10 MG/ML
INJECTION, EMULSION INTRAVENOUS
Status: DISCONTINUED | OUTPATIENT
Start: 2025-06-18 | End: 2025-06-18 | Stop reason: SDUPTHER

## 2025-06-18 RX ORDER — LIDOCAINE HYDROCHLORIDE 20 MG/ML
INJECTION, SOLUTION EPIDURAL; INFILTRATION; INTRACAUDAL; PERINEURAL
Status: DISCONTINUED | OUTPATIENT
Start: 2025-06-18 | End: 2025-06-18 | Stop reason: SDUPTHER

## 2025-06-18 RX ORDER — MIDAZOLAM HYDROCHLORIDE 2 MG/2ML
INJECTION, SOLUTION INTRAMUSCULAR; INTRAVENOUS
Status: DISCONTINUED | OUTPATIENT
Start: 2025-06-18 | End: 2025-06-18 | Stop reason: SDUPTHER

## 2025-06-18 RX ORDER — SODIUM CHLORIDE 0.9 % (FLUSH) 0.9 %
5-40 SYRINGE (ML) INJECTION EVERY 12 HOURS SCHEDULED
Status: DISCONTINUED | OUTPATIENT
Start: 2025-06-18 | End: 2025-06-18 | Stop reason: HOSPADM

## 2025-06-18 RX ORDER — SODIUM CHLORIDE 0.9 % (FLUSH) 0.9 %
5-40 SYRINGE (ML) INJECTION PRN
Status: DISCONTINUED | OUTPATIENT
Start: 2025-06-18 | End: 2025-06-18 | Stop reason: HOSPADM

## 2025-06-18 RX ORDER — SODIUM CHLORIDE 9 MG/ML
INJECTION, SOLUTION INTRAVENOUS PRN
Status: DISCONTINUED | OUTPATIENT
Start: 2025-06-18 | End: 2025-06-18 | Stop reason: HOSPADM

## 2025-06-18 RX ADMIN — LIDOCAINE HYDROCHLORIDE 80 MG: 20 INJECTION, SOLUTION EPIDURAL; INFILTRATION; INTRACAUDAL; PERINEURAL at 09:48

## 2025-06-18 RX ADMIN — SODIUM CHLORIDE, POTASSIUM CHLORIDE, SODIUM LACTATE AND CALCIUM CHLORIDE: 600; 310; 30; 20 INJECTION, SOLUTION INTRAVENOUS at 09:43

## 2025-06-18 RX ADMIN — MIDAZOLAM HYDROCHLORIDE 2 MG: 1 INJECTION, SOLUTION INTRAMUSCULAR; INTRAVENOUS at 09:44

## 2025-06-18 RX ADMIN — PROPOFOL 50 MG: 10 INJECTION, EMULSION INTRAVENOUS at 09:49

## 2025-06-18 RX ADMIN — PROPOFOL 225 MCG/KG/MIN: 10 INJECTION, EMULSION INTRAVENOUS at 09:50

## 2025-06-18 RX ADMIN — PROPOFOL 160 MG: 10 INJECTION, EMULSION INTRAVENOUS at 09:48

## 2025-06-18 ASSESSMENT — ENCOUNTER SYMPTOMS
BACK PAIN: 1
RESPIRATORY NEGATIVE: 1
ABDOMINAL PAIN: 1

## 2025-06-18 ASSESSMENT — LIFESTYLE VARIABLES: SMOKING_STATUS: 0

## 2025-06-18 ASSESSMENT — PAIN - FUNCTIONAL ASSESSMENT
PAIN_FUNCTIONAL_ASSESSMENT: 0-10
PAIN_FUNCTIONAL_ASSESSMENT: ADULT NONVERBAL PAIN SCALE (NPVS)

## 2025-06-18 NOTE — ANESTHESIA PRE PROCEDURE
Department of Anesthesiology  Preprocedure Note       Name:  Nathanael Ramos   Age:  40 y.o.  :  1985                                          MRN:  [id number]         Date:  2025      Surgeon: Surgeon(s):  Dolores Norris MD    Procedure: Procedure(s):  ESOPHAGOGASTRODUODENOSCOPY BIOPSY  COLONOSCOPY DIAGNOSTIC    Medications prior to admission:   Prior to Admission medications    Medication Sig Start Date End Date Taking? Authorizing Provider   cetirizine (ZYRTEC) 10 MG tablet Take 1 tablet by mouth daily as needed 25  Yes ProviderRichard MD   omeprazole (PRILOSEC) 40 MG delayed release capsule Take 1 capsule by mouth every morning (before breakfast) 3/5/25  Yes Jojo Latham PA-C   famotidine (PEPCID) 40 MG tablet Take 1 tablet by mouth every evening 3/5/25  Yes Dolores Norris MD       Current medications:    No current facility-administered medications for this encounter.       Allergies:  No Known Allergies    Problem List:    Patient Active Problem List   Diagnosis Code   • History of narcotic addiction (HCC) F11.21   • Environmental allergies Z91.09   • Heart palpitations R00.2   • Chews tobacco Z72.0   • AA (alcohol abuse) F10.10   • Scrotal cyst L72.9   • Elevated LFTs R79.89   • Hepatomegaly R16.0   • PTSD (post-traumatic stress disorder) F43.10   • Moderate episode of recurrent major depressive disorder (HCC) F33.1   • Gastroesophageal reflux disease without esophagitis K21.9   • Altered bowel habits R19.4   • Diverticulitis K57.92   • Gastroesophageal reflux disease K21.9   • Epigastric pain R10.13   • Hepatic steatosis K76.0   • Alcohol abuse F10.10       Past Medical History:        Diagnosis Date   • Animal dander allergy     dogs and cats   • Asthma    • Depression    • Hearing loss     both ears, tinnitus, does not require aides   • History of narcotic addiction (HCC)     clean since    • PTSD (post-traumatic stress disorder)    • Seasonal allergies        Past Surgical

## 2025-06-18 NOTE — DISCHARGE INSTRUCTIONS
Sedation or General Anesthesia, Adult  Care After  Refer to this sheet in the next 24 hours. These instructions provide you with information on caring for yourself after your procedure. Your caregiver may also give you more specific instructions. Your treatment has been planned according to current medical practices, but problems sometimes occur. Call your caregiver if you have any problems or questions after your procedure.   HOME CARE INSTRUCTIONS   Do not participate in any activities that require you to be alert or coordinated. Do not:  Drive.  Swim.  Ride a bicycle.  Operate heavy machinery.  Cook.  Use power tools.  Climb ladders.  Work at heights.  Take a bath.  Do not drink alcohol.  Do not make any important decisions or sign legal documents.  Stay with an adult.  The first meal following your procedure should be light and small. Avoid solid foods if you feel sick to your stomach (nauseous) or if you throw up (vomit).  Drink enough fluids to keep your urine clear or pale yellow.  Only take your usual medicines or new medicines if your caregiver approves them.  Only take over-the-counter or prescription medicines for pain, discomfort, or fever as directed by your caregiver.  Keep all follow-up appointments as directed by your caregiver.  SEEK IMMEDIATE MEDICAL CARE IF:   You are not feeling normal or behaving normally after 24 hours.  You have persistent nausea and vomiting.  You are unable to drink fluids or eat food.  You have difficulty urinating.  You have difficulty breathing or speaking.  You have blue or gray skin.  There is difficulty waking or you cannot be woken up.  You have heavy bleeding, redness, or a lot of swelling where the sedative or anesthesia entered your skin (intravenous site).  You have a rash.  MAKE SURE YOU:  Understand these instructions.  Will watch your condition.  Will get help right away if you are not doing well or get worse.  Document Released: 12/18/2006 Document Revised:  nausea/vomiting  -Severe abdominal or chest pain not relieved with passing gas  -Fever of 100 degrees or more  -Redness or swelling at the IV site  -Severe sore throat or neck pain

## 2025-06-18 NOTE — ANESTHESIA POSTPROCEDURE EVALUATION
Department of Anesthesiology  Postprocedure Note    Patient: Nathanael Ramos  MRN: [id number]  YOB: 1985  Date of evaluation: 6/18/2025    Procedure Summary       Date: 06/18/25 Room / Location: [place] [address] / [facility]    Anesthesia Start: 0943 Anesthesia Stop: 1027    Procedures:       ESOPHAGOGASTRODUODENOSCOPY BIOPSY (Esophagus)      COLONOSCOPY DIAGNOSTIC (Rectum) Diagnosis:       Altered bowel habits      Diverticulitis      Gastroesophageal reflux disease, unspecified whether esophagitis present      Epigastric pain      Hepatic steatosis      Alcohol abuse      (Altered bowel habits [R19.4])      (Diverticulitis [K57.92])      (Gastroesophageal reflux disease, unspecified whether esophagitis present [K21.9])      (Epigastric pain [R10.13])      (Hepatic steatosis [K76.0])      (Alcohol abuse [F10.10])    Surgeons: Dolores Norris MD Responsible Provider: Allison Auguste MD    Anesthesia Type: general ASA Status: 2            Anesthesia Type: No value filed.    Upma Phase I: Puma Score: 10    Puma Phase II: Puma Score: 10    Anesthesia Post Evaluation    Comments: POST- ANESTHESIA EVALUATION       Pt Name: Nathanael Ramos  MRN: [id number]  YOB: 1985  Date of evaluation: 6/18/2025  Time:  5:08 PM      /73   Pulse 88   Temp 97.7 °F (36.5 °C)   Resp 15   SpO2 100%      Consciousness Level  Awake  Cardiopulmonary Status  Stable  Pain Adequately Treated YES  Nausea / Vomiting  NO  Adequate Hydration  YES  Anesthesia Related Complications NONE      Electronically signed by Allison Auguste MD on 6/18/2025 at 5:08 PM           No notable events documented.

## 2025-06-18 NOTE — H&P
HISTORY and PHYSICAL  [facility]       NAME:  Nathanael Ramos  MRN: [id number]   YOB: 1985   Date: 6/18/2025   Age: 40 y.o.  Gender: male       COMPLAINT AND PRESENT HISTORY:     Nathanael Ramos is 40 y.o.,  male, presents for ESOPHAGOGASTRODUODENOSCOPY BIOPSY, COLONOSCOPY DIAGNOSTIC     Primary dx: Altered bowel habits [R19.4]  Diverticulitis [K57.92]  Gastroesophageal reflux disease, unspecified whether esophagitis present [K21.9]  Epigastric pain [R10.13]  Hepatic steatosis [K76.0]  Alcohol abuse [F10.10].    HPI:  Nathanael Ramos is 40 y.o.,  male, will be having a Colonoscopy and EGD.  No Prior Colonoscopy  and EGD was done  before.   Patient has positive FH of Colon Cancer in his Mother  Patient reports changes in bowel habits,  pt has diarrhea , he has 2-3 BM per day. No GI /Rectal bleeding,  pt has experiencing BRBPR stools.    Pt has constant epigastric pain with abdominal bloating,  no N/V, no weight loss.   Pain aggravated by eating.   Patient denies any Dysphagia. Pt has hx of GERD   pt reports he is not taking any medication .    No fever or chills.  Prep fully completed: yes. Pt reports his last BM is clear liquid     Review of additional significant medical hx:  (See chart for additional detail, including current medications /see ROS for current S/S):     NPO status: pt NPO since the past midnight   Medications taken TODAY (with sip of water): none  Anticoagulation status: none    Denies personal hx of blood clots.  Denies personal hx of MRSA infection.  Denies any personal or family hx of previous complications w/anesthesia.      RECENT IMAGING R/T HPI   No results found.    RECENT LABS, IMAGING AND TESTING     Lab Results   Component Value Date    WBC 10.5 04/01/2024    RBC 5.39 04/01/2024    HGB 15.0 04/01/2024    HCT 46.0 04/01/2024    MCV 85.3 04/01/2024    MCH 27.8 04/01/2024    MCHC 32.6 04/01/2024    RDW 12.1 04/01/2024     03/06/2025    MPV 10.2 04/01/2024

## 2025-06-18 NOTE — OP NOTE
PROCEDURE NOTE    DATE OF PROCEDURE: 6/18/2025    SURGEON: Dolores Norris MD  Facility : [facility]  ASSISTANT: None  Anesthesia: MAC  PREOPERATIVE DIAGNOSIS:   Change in bowel  Diverticulosis    POSTOPERATIVE DIAGNOSIS: as described below    OPERATION: Total colonoscopy     ANESTHESIA: Moderate Sedation    ESTIMATED BLOOD LOSS: less than 50     COMPLICATIONS: None.     SPECIMENS:  Was Not Obtained    HISTORY: The patient is a 40 y.o. year old male with history of above preop diagnosis.  I recommended colonoscopy with possible biopsy or polypectomy and I explained the risk, benefits, expected outcome, and alternatives to the procedure.  Risks included but are not limited to bleeding, infection, respiratory distress, hypotension, and perforation of the colon and possibility of missing a lesion.  The patient understands and is in agreement.        The patient was counseled at length about the risks of estela Covid-19 during their perioperative period and any recovery window from their procedure.  The patient was made aware that estela Covid-19  may worsen their prognosis for recovering from their procedure  and lend to a higher morbidity and/or mortality risk.  All material risks, benefits, and reasonable alternatives including postponing the procedure were discussed. The patient does wish to proceed with the procedure at this time.       PROCEDURE: The patient was given IV conscious sedation.  The patient's SPO2 remained above 90% throughout the procedure.     The colonoscope was inserted per rectum and advanced under direct vision to the cecum without difficulty.      Post sedation note :The patient's SPO2 remained above 90% throughout the procedure.the vital signs remained stable , and no immediate complication form the procedure noted, patient will be ready for d/c when criteria is met .        The prep was good.      Findings:  Terminal ileum: normal    Cecum/Ascending colon:

## 2025-06-19 LAB — SURGICAL PATHOLOGY REPORT: NORMAL

## 2025-07-10 NOTE — PROGRESS NOTES
GI CLINIC FOLLOW UP    INTERVAL HISTORY:   No referring provider defined for this encounter.    Chief Complaint   Patient presents with    Follow Up After Procedure     EGD follow up. Patient states that he has been some throat irritation since procedure.        HISTORY OF PRESENT ILLNESS: Mr.Alex OMAYRA Ramos is a 40 y.o. male , referred for evaluation of hepatic steatosis, LFTs, alcohol use, and GERD w esophagitis.    Here for f/u  Underwent EGD/colonoscopy 6/18/25. EGD remarkable for reflux esophagitis without helm's. Colonoscopy remarkable for rare diverticuli and hemorrhoids only.     Today he reports increased sore throat and possible globus sensation since his EGD. Denies associated dysphagia. Admits he is not taking PPI/pepcid.    Still trying to quit drinking, with difficultly. Drinking at least a pint of whiskey and 2 beers per day. Drinking at this rate for the past 6-7 years, heavier on the weekends. Denies any s/s etoh withdrawal other than irritability. [facility]    [facility] - hgb 43.2, hct 14.2, platelet 259, , AST 46    Denies fever/chills, unintentional weight loss, dysphagia/odynophagia, n/v, abd pain, diarrhea/constipation, black or bloody stools    Chewing tobacco  No marijuana or other substances  No abdominal surgeries  Significant PTSD history          PROCEDURE NOTE     DATE OF PROCEDURE: 6/18/2025     SURGEON: Dolores Norris MD  Facility : \"[facility]  ASSISTANT: None  Anesthesia: MAC  PREOPERATIVE DIAGNOSIS:   Change in bowel  Diverticulosis     POSTOPERATIVE DIAGNOSIS: as described below     OPERATION: Total colonoscopy      ANESTHESIA: Moderate Sedation     ESTIMATED BLOOD LOSS: less than 50      COMPLICATIONS: None.      SPECIMENS:  Was Not Obtained     HISTORY: The patient is a 40 y.o. year old male with history of above preop diagnosis.  I recommended colonoscopy with possible biopsy or polypectomy and I explained the

## 2025-07-16 ENCOUNTER — OFFICE VISIT (OUTPATIENT)
Dept: GASTROENTEROLOGY | Age: 40
End: 2025-07-16
Payer: COMMERCIAL

## 2025-07-16 VITALS
DIASTOLIC BLOOD PRESSURE: 92 MMHG | SYSTOLIC BLOOD PRESSURE: 130 MMHG | BODY MASS INDEX: 30.87 KG/M2 | OXYGEN SATURATION: 96 % | WEIGHT: 234 LBS | RESPIRATION RATE: 16 BRPM | HEART RATE: 84 BPM | TEMPERATURE: 97.7 F

## 2025-07-16 DIAGNOSIS — R10.84 ABDOMINAL PAIN, GENERALIZED: ICD-10-CM

## 2025-07-16 DIAGNOSIS — K57.90 DIVERTICULOSIS: ICD-10-CM

## 2025-07-16 DIAGNOSIS — K76.0 HEPATIC STEATOSIS: ICD-10-CM

## 2025-07-16 DIAGNOSIS — F10.10 ALCOHOL ABUSE: ICD-10-CM

## 2025-07-16 DIAGNOSIS — R79.89 ELEVATED LFTS: ICD-10-CM

## 2025-07-16 DIAGNOSIS — R19.4 ALTERED BOWEL HABITS: ICD-10-CM

## 2025-07-16 DIAGNOSIS — K21.00 GASTROESOPHAGEAL REFLUX DISEASE WITH ESOPHAGITIS WITHOUT HEMORRHAGE: Primary | ICD-10-CM

## 2025-07-16 PROCEDURE — 99214 OFFICE O/P EST MOD 30 MIN: CPT | Performed by: INTERNAL MEDICINE

## 2025-07-16 RX ORDER — OMEPRAZOLE 40 MG/1
40 CAPSULE, DELAYED RELEASE ORAL
Qty: 90 CAPSULE | Refills: 1 | Status: SHIPPED | OUTPATIENT
Start: 2025-07-16

## 2025-07-16 ASSESSMENT — ENCOUNTER SYMPTOMS: SORE THROAT: 1

## 2025-07-28 ENCOUNTER — HOSPITAL ENCOUNTER (OUTPATIENT)
Age: 40
Setting detail: SPECIMEN
Discharge: HOME | End: 2025-07-28

## 2025-07-28 DIAGNOSIS — R79.89 ELEVATED LFTS: ICD-10-CM

## 2025-07-28 LAB
IRON SATN MFR SERPL: 34 % (ref 20–55)
IRON SERPL-MCNC: 129 UG/DL (ref 61–157)
TIBC SERPL-MCNC: 376 UG/DL (ref 250–450)
UNSATURATED IRON BINDING CAPACITY: 247 UG/DL (ref 112–347)

## 2025-07-31 LAB
ALPHA-1 ANTITRYPSIN PHENOTYPE: NORMAL
ALPHA-1 ANTITRYPSIN: 117 MG/DL (ref 90–200)

## 2025-08-07 ENCOUNTER — HOSPITAL ENCOUNTER (OUTPATIENT)
Dept: NUCLEAR MEDICINE | Age: 40
Discharge: HOME | End: 2025-08-09
Attending: INTERNAL MEDICINE
Payer: COMMERCIAL

## 2025-08-07 DIAGNOSIS — R10.84 ABDOMINAL PAIN, GENERALIZED: ICD-10-CM

## 2025-08-07 DIAGNOSIS — R79.89 ELEVATED LFTS: ICD-10-CM

## 2025-08-07 PROCEDURE — A9537 TC99M MEBROFENIN: HCPCS | Performed by: INTERNAL MEDICINE

## 2025-08-07 PROCEDURE — 78227 HEPATOBIL SYST IMAGE W/DRUG: CPT

## 2025-08-07 PROCEDURE — 2500000003 HC RX 250 WO HCPCS: Performed by: INTERNAL MEDICINE

## 2025-08-07 PROCEDURE — 3430000000 HC RX DIAGNOSTIC RADIOPHARMACEUTICAL: Performed by: INTERNAL MEDICINE

## 2025-08-07 RX ORDER — SODIUM CHLORIDE 0.9 % (FLUSH) 0.9 %
10 SYRINGE (ML) INJECTION PRN
Status: DISCONTINUED | OUTPATIENT
Start: 2025-08-07 | End: 2025-08-10 | Stop reason: HOSPADM

## 2025-08-07 RX ADMIN — Medication 5.3 MILLICURIE: at 09:48

## 2025-08-07 RX ADMIN — SODIUM CHLORIDE, PRESERVATIVE FREE 10 ML: 5 INJECTION INTRAVENOUS at 09:48

## (undated) DEVICE — GOWN,POLY REINFORCED,LG: Brand: MEDLINE

## (undated) DEVICE — GLOVE SURG SZ 75 L12IN FNGR THK79MIL GRN LTX FREE

## (undated) DEVICE — FORCEPS BX L240CM JAW DIA2.8MM L CAP W/ NDL MIC MESH TOOTH

## (undated) DEVICE — ENDOSCOPIC KIT 1.1+ 10 FT OP4 CA DE

## (undated) DEVICE — GAUZE,SPONGE,4"X4",16PLY,STRL,LF,10/TRAY: Brand: MEDLINE

## (undated) DEVICE — SINGLE USE AIR/WATER, SUCTION AND BIOPSY VALVES SET: Brand: ORCAPOD™

## (undated) DEVICE — BITEBLOCK 54FR W/ DENT RIM BLOX